# Patient Record
Sex: MALE | Race: OTHER | Employment: FULL TIME | URBAN - METROPOLITAN AREA
[De-identification: names, ages, dates, MRNs, and addresses within clinical notes are randomized per-mention and may not be internally consistent; named-entity substitution may affect disease eponyms.]

---

## 2017-11-07 ENCOUNTER — OFFICE VISIT (OUTPATIENT)
Dept: INTERNAL MEDICINE | Age: 37
End: 2017-11-07
Payer: MEDICARE

## 2017-11-07 VITALS
OXYGEN SATURATION: 98 % | SYSTOLIC BLOOD PRESSURE: 132 MMHG | WEIGHT: 230.2 LBS | HEART RATE: 74 BPM | RESPIRATION RATE: 12 BRPM | HEIGHT: 75 IN | BODY MASS INDEX: 28.62 KG/M2 | DIASTOLIC BLOOD PRESSURE: 82 MMHG

## 2017-11-07 DIAGNOSIS — S76.212A GROIN STRAIN, LEFT, INITIAL ENCOUNTER: ICD-10-CM

## 2017-11-07 DIAGNOSIS — M25.562 CHRONIC PAIN OF LEFT KNEE: Primary | ICD-10-CM

## 2017-11-07 DIAGNOSIS — G57.62 MORTON NEUROMA, LEFT: ICD-10-CM

## 2017-11-07 DIAGNOSIS — G89.29 CHRONIC PAIN OF LEFT KNEE: Primary | ICD-10-CM

## 2017-11-07 DIAGNOSIS — D17.22 LIPOMA OF LEFT UPPER EXTREMITY: ICD-10-CM

## 2017-11-07 PROCEDURE — G8484 FLU IMMUNIZE NO ADMIN: HCPCS | Performed by: INTERNAL MEDICINE

## 2017-11-07 PROCEDURE — 99203 OFFICE O/P NEW LOW 30 MIN: CPT | Performed by: INTERNAL MEDICINE

## 2017-11-07 PROCEDURE — G8427 DOCREV CUR MEDS BY ELIG CLIN: HCPCS | Performed by: INTERNAL MEDICINE

## 2017-11-07 PROCEDURE — G8419 CALC BMI OUT NRM PARAM NOF/U: HCPCS | Performed by: INTERNAL MEDICINE

## 2017-11-07 PROCEDURE — 4004F PT TOBACCO SCREEN RCVD TLK: CPT | Performed by: INTERNAL MEDICINE

## 2017-11-07 ASSESSMENT — PATIENT HEALTH QUESTIONNAIRE - PHQ9
2. FEELING DOWN, DEPRESSED OR HOPELESS: 0
1. LITTLE INTEREST OR PLEASURE IN DOING THINGS: 0
SUM OF ALL RESPONSES TO PHQ9 QUESTIONS 1 & 2: 0
SUM OF ALL RESPONSES TO PHQ QUESTIONS 1-9: 0

## 2017-11-07 ASSESSMENT — ENCOUNTER SYMPTOMS
GASTROINTESTINAL NEGATIVE: 1
WHEEZING: 1
EYES NEGATIVE: 1
ALLERGIC/IMMUNOLOGIC NEGATIVE: 1

## 2017-11-07 NOTE — PROGRESS NOTES
[facility] INTERNAL MEDICINE [place] [address] [address]  [address]   [address] [address]  Dept: [phone]  Dept Fax: [phone]    Madeline Mcclure is a 44 y.o. male who presents today for   Chief Complaint   Patient presents with   [facility].    and follow up of chronic medical problems: There is no problem list on file for this patient. .    No past medical history on file. No past surgical history on file. No family history on file. Social History   Substance Use Topics    Smoking status: Not on file    Smokeless tobacco: Not on file    Alcohol use Not on file      No current outpatient prescriptions on file. No current facility-administered medications for this visit. Allergies not on file    Health Maintenance   Topic Date Due    HIV screen  10/14/1994    DTaP/Tdap/Td vaccine (1 - Tdap) 10/14/1998    Flu vaccine (1) 09/01/2017       Controlled Substances Monitoring:      HPI:     Knee Pain    The incident occurred more than 1 week ago (over a year). There was no injury mechanism. The pain is present in the left knee. The pain is moderate. Associated symptoms comments: At times swollen. . The symptoms are aggravated by weight bearing (kneeling). He has tried nothing for the symptoms. The treatment provided no relief. Groin Pain   The patient's primary symptoms include pelvic pain. Primary symptoms comment: left inguinal area. This is a new problem. The current episode started more than 1 year ago. The problem occurs constantly. The problem has been waxing and waning. The pain is medium (worse with walking). Associated symptoms include chills and a rash. The symptoms are aggravated by heavy lifting (walking). He has tried nothing for the symptoms. Rash   This is a new problem. The current episode started more than 1 year ago (several years has gotten larger and painful with lifting). The affected locations include the left arm (medial upper arm).  Past other physician or provider since your last visit? No  Have you had any other diagnostic tests since your last visit? No  Have you been seen in the emergency room and/or had an admission to a hospital since we last saw you? No  Have you had your routine dental cleaning in the past 6 months? no    Have you activated your [employer] account? If not, what are your barriers? No:     No care team member to display    Medical History Review  Past Medical, Family, and Social History reviewed and does contribute to the patient presenting condition    Health Maintenance   Topic Date Due    HIV screen  10/14/1994    DTaP/Tdap/Td vaccine (1 - Tdap) 10/14/1998    Flu vaccine (1) 09/01/2017     /82   Pulse 74   Resp 12   Ht 6' 3\" (1.905 m)   Wt 230 lb 3.2 oz (104.4 kg)   SpO2 98%   BMI 28.77 kg/m²     Assessment:      No diagnosis found. Plan:       1.  Jason Wyatt received counseling on the following healthy behaviors: medication adherence    2. Prior labs and health maintenance reviewed. 3.  Discussed use, benefit, and side effects of prescribed medications. Barriers to medication compliance addressed. All his questions were answered. Pt voiced understanding. Jason Wyatt will continue current medications, diet and exercise. No orders of the defined types were placed in this encounter. Completed Refills               Requested Prescriptions      No prescriptions requested or ordered in this encounter       4. Patient given educational materials - see patient instructions    5. Was a self-tracking handout given in paper form or via [employer]? No  If yes, see orders or list here. No orders of the defined types were placed in this encounter. No Follow-up on file. Patient agreed with treatment plan.     Electronically signed by Rah Wolff MD on 11/7/2017 at 3:24 PM

## 2017-12-06 ENCOUNTER — OFFICE VISIT (OUTPATIENT)
Dept: SURGERY | Age: 37
End: 2017-12-06
Payer: MEDICARE

## 2017-12-06 VITALS
DIASTOLIC BLOOD PRESSURE: 84 MMHG | WEIGHT: 233.2 LBS | TEMPERATURE: 97.5 F | SYSTOLIC BLOOD PRESSURE: 136 MMHG | BODY MASS INDEX: 29 KG/M2 | HEART RATE: 71 BPM | HEIGHT: 75 IN

## 2017-12-06 DIAGNOSIS — R22.32 MASS OF ARM, LEFT: ICD-10-CM

## 2017-12-06 DIAGNOSIS — R10.32 GROIN PAIN, LEFT: Primary | ICD-10-CM

## 2017-12-06 PROCEDURE — 4004F PT TOBACCO SCREEN RCVD TLK: CPT | Performed by: STUDENT IN AN ORGANIZED HEALTH CARE EDUCATION/TRAINING PROGRAM

## 2017-12-06 PROCEDURE — G8419 CALC BMI OUT NRM PARAM NOF/U: HCPCS | Performed by: STUDENT IN AN ORGANIZED HEALTH CARE EDUCATION/TRAINING PROGRAM

## 2017-12-06 PROCEDURE — G8427 DOCREV CUR MEDS BY ELIG CLIN: HCPCS | Performed by: STUDENT IN AN ORGANIZED HEALTH CARE EDUCATION/TRAINING PROGRAM

## 2017-12-06 PROCEDURE — G8484 FLU IMMUNIZE NO ADMIN: HCPCS | Performed by: STUDENT IN AN ORGANIZED HEALTH CARE EDUCATION/TRAINING PROGRAM

## 2017-12-06 PROCEDURE — 99202 OFFICE O/P NEW SF 15 MIN: CPT | Performed by: STUDENT IN AN ORGANIZED HEALTH CARE EDUCATION/TRAINING PROGRAM

## 2017-12-06 NOTE — PROGRESS NOTES
Social History Main Topics    Smoking status: Light Tobacco Smoker     Types: Cigarettes    Smokeless tobacco: Never Used    Alcohol use Yes      Comment: socially     Drug use: No    Sexual activity: Yes     Partners: Female     Other Topics Concern    Not on file     Social History Narrative    No narrative on file       ROS:   GEN: Denies recent weight loss, fatigue, fevers, chills, admits to Left groin pain when walking and left arm mass  HEENT: No rhinorrhea, dysphagia, odynphagia. CV: No history of MI, recent chest pain. RESP: Denies shortness of breath, COPD, asthma. GI: denies any n/v, diarrhea or constipation    : Denies increased frequency or dysuria. HEM[de-identified] Denies history of anemia or DVTs. ENDO: Denies history of thyroid problems or diabetes. NEURO: Denies history of CVA, TIA. Physical Exam:  Vitals:    12/06/17 1057   BP: 136/84   Pulse: 71   Temp: 97.5 °F (36.4 °C)     General:A & O x3  HEENT:  NCAT, PERRL, EMOI, oral mucus membrane pink and moist, no mass palpated on neck exam  BREAST:Deferred  Heart: S1S2, no mumurs, RRR  Lungs: clear to auscultation without wheezes or rales  Abdomen: soft, nontender, no HSM, no guarding, no rebound, no masses, pot modest about inguinal exam and would not let examiner do full exam   RECTAL: deferred  Extremity: Normal, without deformities, edema, or skin discoloration  SKIN: Skin color, texture, turgor normal. No rashes or lesions. Neuro: CN II-XII grossly intact. No motor or sensory deficits appreciated. MK:normal throughout upper and lower extremities, mass of inner upper left arm 2x2cm mobile, non tender to palpation     Assessment     1. Groin pain, left  CT ABDOMEN PELVIS W IV CONTRAST Additional Contrast? Oral   2. Mass of arm, left         Plan   1.  CT abd/plv ordered for evaluation of left inguinal/groin pain, will f/u with results once completed  2.  Discussion about left arm mass and pt would prefer no further workup at this time or intervention and pt was informed to come back as needed when comfortable with evaluation/surgery  3. Recommended pt to return to PCP or call for referral to an orthopedic surgeon for his b/l knee pain L>R that he has been having for years and progressively getting worse    Mandeepelenita Laura  12/6/2017    I have reviewed the resident's note and I either performed the key elements of the medical history and physical exam or was present with the resident when the key elements of the medical history and physical exam were performed. I have discussed the findings, established the care plan and recommendations with Resident.     Electronically signed by Yvette Damon IV, DO on 12/13/17 at 9:10 AM

## 2017-12-29 ENCOUNTER — HOSPITAL ENCOUNTER (OUTPATIENT)
Dept: GENERAL RADIOLOGY | Age: 37
Discharge: HOME | End: 2017-12-29
Payer: MEDICARE

## 2017-12-29 ENCOUNTER — HOSPITAL ENCOUNTER (OUTPATIENT)
Age: 37
Discharge: HOME | End: 2017-12-29
Payer: MEDICARE

## 2017-12-29 ENCOUNTER — HOSPITAL ENCOUNTER (OUTPATIENT)
Dept: CT IMAGING | Age: 37
Discharge: HOME | End: 2017-12-29
Payer: MEDICARE

## 2017-12-29 DIAGNOSIS — R10.32 GROIN PAIN, LEFT: ICD-10-CM

## 2017-12-29 DIAGNOSIS — M25.562 CHRONIC PAIN OF LEFT KNEE: ICD-10-CM

## 2017-12-29 DIAGNOSIS — G89.29 CHRONIC PAIN OF LEFT KNEE: ICD-10-CM

## 2017-12-29 PROCEDURE — 73562 X-RAY EXAM OF KNEE 3: CPT

## 2017-12-29 PROCEDURE — 74177 CT ABD & PELVIS W/CONTRAST: CPT

## 2017-12-29 PROCEDURE — 6360000004 HC RX CONTRAST MEDICATION: Performed by: STUDENT IN AN ORGANIZED HEALTH CARE EDUCATION/TRAINING PROGRAM

## 2017-12-29 RX ADMIN — IOPAMIDOL 75 ML: 755 INJECTION, SOLUTION INTRAVENOUS at 09:48

## 2017-12-29 RX ADMIN — IOHEXOL 50 ML: 240 INJECTION, SOLUTION INTRATHECAL; INTRAVASCULAR; INTRAVENOUS; ORAL at 08:45

## 2018-01-17 ENCOUNTER — OFFICE VISIT (OUTPATIENT)
Dept: PODIATRY | Age: 38
End: 2018-01-17
Payer: MEDICARE

## 2018-01-17 VITALS — HEIGHT: 75 IN | BODY MASS INDEX: 28.6 KG/M2 | WEIGHT: 230 LBS

## 2018-01-17 DIAGNOSIS — M79.671 PAIN IN BOTH FEET: ICD-10-CM

## 2018-01-17 DIAGNOSIS — G57.62 NEUROMA, MORTON'S, LEFT: ICD-10-CM

## 2018-01-17 DIAGNOSIS — M72.2 PLANTAR FASCIAL FIBROMATOSIS: Primary | ICD-10-CM

## 2018-01-17 DIAGNOSIS — M79.672 PAIN IN BOTH FEET: ICD-10-CM

## 2018-01-17 PROCEDURE — G8419 CALC BMI OUT NRM PARAM NOF/U: HCPCS | Performed by: PODIATRIST

## 2018-01-17 PROCEDURE — 99203 OFFICE O/P NEW LOW 30 MIN: CPT | Performed by: PODIATRIST

## 2018-01-17 PROCEDURE — L3020 FOOT LONGITUD/METATARSAL SUP: HCPCS | Performed by: PODIATRIST

## 2018-01-17 PROCEDURE — 4004F PT TOBACCO SCREEN RCVD TLK: CPT | Performed by: PODIATRIST

## 2018-01-17 PROCEDURE — G8484 FLU IMMUNIZE NO ADMIN: HCPCS | Performed by: PODIATRIST

## 2018-01-17 PROCEDURE — G8427 DOCREV CUR MEDS BY ELIG CLIN: HCPCS | Performed by: PODIATRIST

## 2018-01-17 NOTE — PROGRESS NOTES
[facility] Podiatry  New Patient History and Physical    Chief Complaint   Patient presents with    New Patient    Follow-Up from [facility]'s ED    Neuroma         HPI: Campos Fonseca is a 44 y.o. male who presents to the office today complaining of possible haskins's neuroma. Symptoms began several month(s) ago. Patient has noticed bump getting bigger and having trouble finding shoes that fit Patient relates pain is Present. Pain is rated 7 out of 10 and is described as constant, moderate, severe. Treatments prior to today's visit include: yes, seen at s ED. Currently denies F/C/N/V. No Known Allergies    No past medical history on file. Prior to Admission medications    Not on File       No past surgical history on file. No family history on file. Social History   Substance Use Topics    Smoking status: Light Tobacco Smoker     Types: Cigarettes    Smokeless tobacco: Never Used    Alcohol use Yes      Comment: socially        Review of Systems    Lower Extremity Physical Examination:     Vitals: There were no vitals filed for this visit. General: AAO x 3 in NAD. Dermatologic Exam:  Skin lesion/ulceration Absent . Skin No rashes or nodules noted. .       Musculoskeletal:     1st MPJ ROM decreased, Bilateral.  Muscle strength 5/5, Bilateral.  Pain present upon palpation of right and left foot 3rd interspace. Pain on palpation of the medial calcaneal tuberosity of the right and left foot. Pain illicited with DF of the toes. .  Medial longitudinal arch, Bilateral WNL.   Ankle ROM WNL,Bilateral.    Dorsally contracted digits absent digits 1-5 Bilateral.     Vascular: DP and PT pulses palpable 2/4, Bilateral.  CFT <3 seconds, Bilateral.  Hair growth present to the level of the digits, Bilateral.  Edema absent, Bilateral.  Varicosities absent, Bilateral. Erythema absent, Bilateral    Neurological: Sensation intact to light touch to level of digits, Bilateral.  Protective

## 2018-02-07 ENCOUNTER — OFFICE VISIT (OUTPATIENT)
Dept: INTERNAL MEDICINE | Age: 38
End: 2018-02-07
Payer: MEDICARE

## 2018-02-07 VITALS
BODY MASS INDEX: 30.54 KG/M2 | DIASTOLIC BLOOD PRESSURE: 84 MMHG | HEART RATE: 78 BPM | OXYGEN SATURATION: 98 % | HEIGHT: 75 IN | RESPIRATION RATE: 12 BRPM | SYSTOLIC BLOOD PRESSURE: 124 MMHG | WEIGHT: 245.6 LBS

## 2018-02-07 DIAGNOSIS — Z00.00 WELL ADULT EXAM: Primary | ICD-10-CM

## 2018-02-07 PROCEDURE — 99395 PREV VISIT EST AGE 18-39: CPT | Performed by: INTERNAL MEDICINE

## 2018-02-07 ASSESSMENT — ENCOUNTER SYMPTOMS
ALLERGIC/IMMUNOLOGIC NEGATIVE: 1
EYES NEGATIVE: 1
RESPIRATORY NEGATIVE: 1
GASTROINTESTINAL NEGATIVE: 1

## 2018-02-07 NOTE — PROGRESS NOTES
[address] [facility] INTERNAL MEDICINE [place] [address] [id number]  [address]   [address] [address]  Dept: [phone]  Dept Fax: [phone]    Matt Pastrana is a 44 y.o. male who presents today for   Chief Complaint   Patient presents with    3 Month Follow-Up    and follow up of chronic medical problems: There is no problem list on file for this patient. .    No past medical history on file. No past surgical history on file. No family history on file. Social History   Substance Use Topics    Smoking status: Light Tobacco Smoker     Types: Cigarettes    Smokeless tobacco: Never Used    Alcohol use Yes      Comment: socially       No current outpatient prescriptions on file. No current facility-administered medications for this visit. No Known Allergies    Health Maintenance   Topic Date Due    Pneumococcal med risk (1 of 1 - PPSV23) 08/07/2018 (Originally 10/14/1998)    DTaP/Tdap/Td vaccine (1 - Tdap) 11/07/2018 (Originally 10/14/1998)    Flu vaccine (1) 11/07/2018 (Originally 9/1/2017)    HIV screen  11/07/2018 (Originally 10/14/1994)       Controlled Substances Monitoring:      HPI:     Here for physical and well visit. Podiatrist diagnosed bilateral plantar fasciitis. And Benavides's neuroma. He has ordered custom inserts. General surgery ordered a CT of abdomen which was normal.        ROS:     Review of Systems   Constitutional: Negative. HENT: Negative. Eyes: Negative. Respiratory: Negative. Cardiovascular: Negative. Gastrointestinal: Negative. Endocrine: Negative. Genitourinary: Negative. Musculoskeletal: Negative. Skin: Negative. Allergic/Immunologic: Negative. Neurological: Negative. Hematological: Negative. Psychiatric/Behavioral: Negative. All other systems reviewed and are negative. Objective:     Physical Exam   Constitutional: He is oriented to person, place, and time. He appears well-developed and well-nourished.

## 2018-03-23 DIAGNOSIS — M77.31 CALCANEAL SPUR OF BOTH FEET: ICD-10-CM

## 2018-03-23 DIAGNOSIS — M72.2 PLANTAR FASCIAL FIBROMATOSIS: Primary | ICD-10-CM

## 2018-03-23 DIAGNOSIS — M77.32 CALCANEAL SPUR OF BOTH FEET: ICD-10-CM

## 2018-04-11 PROBLEM — Z00.00 WELL ADULT EXAM: Status: RESOLVED | Noted: 2018-02-07 | Resolved: 2018-04-11

## 2018-04-24 ENCOUNTER — OFFICE VISIT (OUTPATIENT)
Dept: PODIATRY | Age: 38
End: 2018-04-24
Payer: MEDICARE

## 2018-04-24 VITALS — HEIGHT: 75 IN | WEIGHT: 230 LBS | BODY MASS INDEX: 28.6 KG/M2

## 2018-04-24 DIAGNOSIS — M79.672 PAIN IN BOTH FEET: ICD-10-CM

## 2018-04-24 DIAGNOSIS — M25.551 HIP PAIN, BILATERAL: ICD-10-CM

## 2018-04-24 DIAGNOSIS — G57.62 MORTON'S NEUROMA, LEFT: ICD-10-CM

## 2018-04-24 DIAGNOSIS — M25.552 HIP PAIN, BILATERAL: ICD-10-CM

## 2018-04-24 DIAGNOSIS — M79.671 PAIN IN BOTH FEET: ICD-10-CM

## 2018-04-24 DIAGNOSIS — M72.2 PLANTAR FASCIAL FIBROMATOSIS: Primary | ICD-10-CM

## 2018-04-24 PROCEDURE — G8417 CALC BMI ABV UP PARAM F/U: HCPCS | Performed by: PODIATRIST

## 2018-04-24 PROCEDURE — 99213 OFFICE O/P EST LOW 20 MIN: CPT | Performed by: PODIATRIST

## 2018-04-24 PROCEDURE — G8427 DOCREV CUR MEDS BY ELIG CLIN: HCPCS | Performed by: PODIATRIST

## 2018-04-24 PROCEDURE — 4004F PT TOBACCO SCREEN RCVD TLK: CPT | Performed by: PODIATRIST

## 2018-04-24 RX ORDER — PREDNISONE 10 MG/1
TABLET ORAL
Qty: 20 TABLET | Refills: 0 | Status: SHIPPED | OUTPATIENT
Start: 2018-04-24

## 2018-07-28 ENCOUNTER — HOSPITAL ENCOUNTER (EMERGENCY)
Age: 38
Discharge: HOME | End: 2018-07-28
Attending: EMERGENCY MEDICINE
Payer: MEDICARE

## 2018-07-28 VITALS
RESPIRATION RATE: 17 BRPM | DIASTOLIC BLOOD PRESSURE: 77 MMHG | TEMPERATURE: 98.2 F | OXYGEN SATURATION: 93 % | SYSTOLIC BLOOD PRESSURE: 126 MMHG | HEART RATE: 97 BPM

## 2018-07-28 DIAGNOSIS — T40.1X1A ACCIDENTAL OVERDOSE OF HEROIN, INITIAL ENCOUNTER (HCC): Primary | ICD-10-CM

## 2018-07-28 PROCEDURE — 96374 THER/PROPH/DIAG INJ IV PUSH: CPT

## 2018-07-28 PROCEDURE — 99284 EMERGENCY DEPT VISIT MOD MDM: CPT

## 2018-07-28 PROCEDURE — 6360000002 HC RX W HCPCS: Performed by: EMERGENCY MEDICINE

## 2018-07-28 RX ORDER — NALOXONE HYDROCHLORIDE 1 MG/ML
2 INJECTION INTRAMUSCULAR; INTRAVENOUS; SUBCUTANEOUS ONCE
Status: COMPLETED | OUTPATIENT
Start: 2018-07-28 | End: 2018-07-28

## 2018-07-28 RX ADMIN — NALOXONE HYDROCHLORIDE 2 MG: 1 INJECTION PARENTERAL at 21:46

## 2018-07-28 ASSESSMENT — ENCOUNTER SYMPTOMS
NAUSEA: 1
RHINORRHEA: 0
COLOR CHANGE: 0
ABDOMINAL PAIN: 0
COUGH: 0
VOMITING: 0
SHORTNESS OF BREATH: 0
EYE PAIN: 0
SORE THROAT: 0

## 2018-07-28 NOTE — ED NOTES
Bed: 21  Expected date:   Expected time:   Means of arrival:   Comments:  18 Pastor Wolf RN  07/28/18 1929

## 2018-07-28 NOTE — ED NOTES
Bed: 15  Expected date:   Expected time:   Means of arrival:   Comments:  [address] [place] [address] Rd, RN  07/28/18 1932

## 2018-07-28 NOTE — ED PROVIDER NOTES
[facility]     Emergency Department     Faculty Attestation    I performed a history and physical examination of the patient and discussed management with the resident. I reviewed the residents note and agree with the documented findings and plan of care. Any areas of disagreement are noted on the chart. I was personally present for the key portions of any procedures. I have documented in the chart those procedures where I was not present during the key portions. I have reviewed the emergency nurses triage note. I agree with the chief complaint, past medical history, past surgical history, allergies, medications, social and family history as documented unless otherwise noted below. For Physician Assistant/ Nurse Practitioner cases/documentation I have personally evaluated this patient and have completed at least one if not all key elements of the E/M (history, physical exam, and MDM). Additional findings are as noted. Overdose responded to Narcan, awake alert and oriented, pupils 2 mm and reactive, good airway, equal breath sounds, heart regular rate and rhythm.  DART officer in room    Guy Rm MD  Attending Emergency  Physician              Arthur Borrego MD  07/28/18 5945

## 2018-07-29 NOTE — ED NOTES
Pt ambulated with a steady gait. NAD noted. Pt mother given a Narcan kit and instructed on use.       Radha Oglesby RN  07/28/18 8661

## 2018-07-29 NOTE — ED NOTES
Pt states that he is still feeling affects of Heroin to physician      Deisi Perez, MERCEDES  07/28/18 2154       Deisi Perez, [facility]  07/28/18 2155

## 2018-07-29 NOTE — ED PROVIDER NOTES
predniSONE (DELTASONE) 10 MG tablet One PO TID x 3 days One PO BID x 3 days One PO q daily x 3 days 1/2 po q daily x 4 days 4/24/18   Moose Lau DPM       REVIEW OF SYSTEMS    (2-9 systems for level 4, 10 or more for level 5)      Review of Systems   Constitutional: Negative for chills and fever. HENT: Negative for rhinorrhea and sore throat. Eyes: Negative for pain and visual disturbance. Respiratory: Negative for cough and shortness of breath. Cardiovascular: Negative for chest pain and palpitations. Gastrointestinal: Positive for nausea. Negative for abdominal pain and vomiting. Genitourinary: Negative for difficulty urinating and dysuria. Musculoskeletal: Negative for arthralgias and myalgias. Skin: Negative for color change and wound. Neurological: Negative for weakness, numbness and headaches. Psychiatric/Behavioral: Negative for behavioral problems and dysphoric mood. PHYSICAL EXAM   (up to 7 for level 4, 8 or more for level 5)      INITIAL VITALS:   /77   Pulse 97   Temp 98.2 °F (36.8 °C) (Oral)   Resp 17   SpO2 93%     Physical Exam   Constitutional: He is oriented to person, place, and time. He appears well-developed and well-nourished. No distress. HENT:   Head: Normocephalic and atraumatic. Mouth/Throat: Oropharynx is clear and moist.   Eyes: EOM are normal. Pupils are equal, round, and reactive to light. Neck: Normal range of motion. Cardiovascular: Normal rate and regular rhythm. Pulmonary/Chest: Effort normal. He has no wheezes. He has no rales. Abdominal: Soft. There is no tenderness. There is no rebound and no guarding. Musculoskeletal: Normal range of motion. Neurological: He is alert and oriented to person, place, and time. He has normal strength. No cranial nerve deficit or sensory deficit. GCS eye subscore is 4. GCS verbal subscore is 5. GCS motor subscore is 6. Skin: Skin is warm and dry.    Psychiatric: His behavior is normal. DIFFERENTIAL  DIAGNOSIS     PLAN (LABS / IMAGING / EKG):  No orders of the defined types were placed in this encounter. MEDICATIONS ORDERED:  Orders Placed This Encounter   Medications    naloxone (NARCAN) injection 2 mg       DIAGNOSTIC RESULTS / EMERGENCY DEPARTMENT COURSE / MDM     LABS:  No results found for this visit on 07/28/18. IMPRESSION: Patient was brought in by EMS after suspected heroin overdose. Patient states that he did snort heroin today. Patient's afebrile, little tachycardic in the 100s, but otherwise hemodynamically stable. Pupils are about 3 mm bilaterally and react to light. He is neurologically intact no gross motor sensory deficit. Given his presentation, we'll plan to observe for the next 2 hours. Anticipate discharge. RADIOLOGY:  None    EKG  None    All EKG's are interpreted by the Emergency Department Physician who either signs or Co-signs this chart in the absence of a cardiologist.    EMERGENCY DEPARTMENT COURSE:  Patient was observed in the ED for several hours. Patient's vitals were stable. He was able to ambulate without difficulty. He is tolerating oral intake. She was seen, evaluated and spoken to by SISSY. Recommended follow-up with his PCP. Discussed that if symptoms persist or worsen, that he should return to the ED. Patient demonstrated his understanding and is agreeable to plan. Patient is stable for discharge. PROCEDURES:  None    CONSULTS:  None    CRITICAL CARE:  None    FINAL IMPRESSION      1.  Accidental overdose of heroin, initial encounter          DISPOSITION / PLAN     DISPOSITION Decision To Discharge 07/28/2018 09:46:15 PM      PATIENT REFERRED TO:  Dell Moser MD  [address]  [phone]    Schedule an appointment as soon as possible for a visit   As needed, If symptoms worsen      DISCHARGE MEDICATIONS:  Discharge Medication List as of 7/28/2018  9:46 PM          Guillermina Pugh

## 2022-04-12 ENCOUNTER — HOSPITAL ENCOUNTER (EMERGENCY)
Age: 42
Discharge: HOME | End: 2022-04-12
Attending: EMERGENCY MEDICINE
Payer: COMMERCIAL

## 2022-04-12 VITALS
WEIGHT: 235 LBS | HEART RATE: 92 BPM | TEMPERATURE: 97.4 F | SYSTOLIC BLOOD PRESSURE: 147 MMHG | BODY MASS INDEX: 29.22 KG/M2 | OXYGEN SATURATION: 97 % | RESPIRATION RATE: 16 BRPM | HEIGHT: 75 IN | DIASTOLIC BLOOD PRESSURE: 95 MMHG

## 2022-04-12 DIAGNOSIS — S39.012A STRAIN OF LUMBAR REGION, INITIAL ENCOUNTER: Primary | ICD-10-CM

## 2022-04-12 PROCEDURE — 99283 EMERGENCY DEPT VISIT LOW MDM: CPT

## 2022-04-12 PROCEDURE — 6370000000 HC RX 637 (ALT 250 FOR IP): Performed by: STUDENT IN AN ORGANIZED HEALTH CARE EDUCATION/TRAINING PROGRAM

## 2022-04-12 RX ORDER — IBUPROFEN 800 MG/1
800 TABLET ORAL ONCE
Status: COMPLETED | OUTPATIENT
Start: 2022-04-12 | End: 2022-04-12

## 2022-04-12 RX ORDER — IBUPROFEN 800 MG/1
800 TABLET ORAL EVERY 6 HOURS PRN
Qty: 21 TABLET | Refills: 0 | Status: SHIPPED | OUTPATIENT
Start: 2022-04-12 | End: 2022-04-17

## 2022-04-12 RX ORDER — CYCLOBENZAPRINE HCL 10 MG
10 TABLET ORAL ONCE
Status: COMPLETED | OUTPATIENT
Start: 2022-04-12 | End: 2022-04-12

## 2022-04-12 RX ORDER — ACETAMINOPHEN 500 MG
1000 TABLET ORAL ONCE
Status: COMPLETED | OUTPATIENT
Start: 2022-04-12 | End: 2022-04-12

## 2022-04-12 RX ORDER — CYCLOBENZAPRINE HCL 10 MG
10 TABLET ORAL 3 TIMES DAILY PRN
Qty: 9 TABLET | Refills: 0 | Status: SHIPPED | OUTPATIENT
Start: 2022-04-12 | End: 2022-04-15

## 2022-04-12 RX ORDER — LIDOCAINE 4 G/G
1 PATCH TOPICAL ONCE
Status: DISCONTINUED | OUTPATIENT
Start: 2022-04-12 | End: 2022-04-12 | Stop reason: HOSPADM

## 2022-04-12 RX ADMIN — CYCLOBENZAPRINE 10 MG: 10 TABLET, FILM COATED ORAL at 02:15

## 2022-04-12 RX ADMIN — ACETAMINOPHEN 1000 MG: 500 TABLET ORAL at 02:16

## 2022-04-12 RX ADMIN — IBUPROFEN 800 MG: 800 TABLET, FILM COATED ORAL at 02:15

## 2022-04-12 ASSESSMENT — ENCOUNTER SYMPTOMS
SHORTNESS OF BREATH: 0
NAUSEA: 0
VOMITING: 0
ABDOMINAL PAIN: 0
BACK PAIN: 1

## 2022-04-12 ASSESSMENT — PAIN DESCRIPTION - LOCATION: LOCATION: BACK

## 2022-04-12 ASSESSMENT — PAIN SCALES - GENERAL
PAINLEVEL_OUTOF10: 9
PAINLEVEL_OUTOF10: 8

## 2022-04-12 ASSESSMENT — PAIN DESCRIPTION - PAIN TYPE: TYPE: ACUTE PAIN

## 2022-04-12 ASSESSMENT — PAIN DESCRIPTION - DESCRIPTORS: DESCRIPTORS: SHARP

## 2022-04-12 NOTE — ED NOTES
Report given to Zhanna Miller RN  Patient was filling out workmans comp paperwork   Paperwork to be completed by Dr. Zeenat Donaldson then patient can be discharged     Helen Omer RN  04/12/22 9315

## 2022-04-12 NOTE — ED NOTES
Patient presented to ER with complaints of back pain after feeling a sharp pain in back while lifting a box at work  No numbness or tingling verbalized  Patient able to ambulate steadily but verbalizes pain  Pain 7/10  Patient given call light     Adriel Núñez RN  04/12/22 7115

## 2022-04-12 NOTE — ED PROVIDER NOTES
[facility]     Emergency Department     Faculty Attestation    I performed a history and physical examination of the patient and discussed management with the resident. I have reviewed and agree with the residents findings including all diagnostic interpretations, and treatment plans as written. Any areas of disagreement are noted on the chart. I was personally present for the key portions of any procedures. I have documented in the chart those procedures where I was not present during the key portions. I have reviewed the emergency nurses triage note. I agree with the chief complaint, past medical history, past surgical history, allergies, medications, social and family history as documented unless otherwise noted below. Documentation of the HPI, Physical Exam and Medical Decision Making performed by scriblucy is based on my personal performance of the HPI, PE and MDM. For Physician Assistant/ Nurse Practitioner cases/documentation I have personally evaluated this patient and have completed at least one if not all key elements of the E/M (history, physical exam, and MDM). Additional findings are as noted. 43 yo M c/o L lower back pain while at work, [employer]" object, no direct blow, no sensory change, no urinary complaint, no fever,   Pt denies iv drug use,   pe vss gcs 15, steady even gait, abdomen non tender, no distension, no rigidity, no mass, well localized / reproducible tenderness L lateral to L 45, skin intact, no indication of back infection / injury, no midline thoracic or lumbar tenderness,   Sensation intact bilateral lower extremities,     -no finding of cord compression, I feel pt stable for out pt tx,     EKG Interpretation    Interpreted by me      CRITICAL CARE: There was a high probability of clinically significant/life threatening deterioration in this patient's condition which required my urgent intervention.   Total critical care time was 0 minutes. This excludes any time for separately reportable procedures.        [facility], DO  04/12/22 0150       Abdulkadir [facility], DO  04/12/22 9028       [facility], DO  04/12/22 0230

## 2022-04-12 NOTE — ED PROVIDER NOTES
[address] [facility]  ED  Emergency Department Encounter  Emergency Medicine Resident     Pt Name: Maura Kanner  MRN: [id number]  Rasheedagfkrissy 1980  Date of evaluation: 4/12/22  PCP:  No primary care provider on file. CHIEF COMPLAINT       Chief Complaint   Patient presents with    Back Pain       HISTORY OFPRESENT ILLNESS  (Location/Symptom, Timing/Onset, Context/Setting, Quality, Duration, Modifying Jose Banana.)      Maura Kanner is a 42 y. o.yo male who presents with back pain. 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 Denies any bone pain any weakness in the trouble urinating, dysuria, hematuria. States he has no weakness or changes in gait. Does have a remote history of IV drug use, heroin states he has not done any IV drug use since 3 years. Denies fevers or chills or traumatic injuries. PAST MEDICAL / SURGICAL / SOCIAL / FAMILY HISTORY      has no past medical history on file. has no past surgical history on file.      Social History     Socioeconomic History    Marital status: Single     Spouse name: Not on file    Number of children: Not on file    Years of education: Not on file    Highest education level: Not on file   Occupational History    Not on file   Tobacco Use    Smoking status: Former Smoker     Types: Cigarettes    Smokeless tobacco: Never Used   Substance and Sexual Activity    Alcohol use: Yes     Comment: socially     Drug use: No    Sexual activity: Yes     Partners: Female   Other Topics Concern    Not on file   Social History Narrative    Not on file     Social Determinants of Health     Financial Resource Strain:     Difficulty of Paying Living Expenses: Not on file   Food Insecurity:     Worried About Running Out of Food in the Last Year: Not on file    [address] in the Last Year: Not on file   Transportation Needs:     Lack of Transportation (Medical): Not on file    Lack of Transportation (Non-Medical): Not on file   Physical Activity:     Days of Exercise per Week: Not on file    Minutes of Exercise per Session: Not on file   Stress:     Feeling of Stress : Not on file   Social Connections:     Frequency of Communication with Friends and Family: Not on file    Frequency of Social Gatherings with Friends and Family: Not on file    Attends Jainism Services: Not on file    Active Member of [address] [place] [address] [employer] or Organizations: Not on file    Attends Club or Organization Meetings: Not on file    Marital Status: Not on file   Intimate Partner Violence:     Fear of Current or Ex-Partner: Not on file    Emotionally Abused: Not on file    Physically Abused: Not on file    Sexually Abused: Not on file   Housing Stability:     Unable to Pay for Housing in the Last Year: Not on file    Number of [place] in the Last Year: Not on file    Unstable Housing in the Last Year: Not on file       History reviewed. No pertinent family history. Allergies:  Patient has no known allergies. Home Medications:  Prior to Admission medications    Medication Sig Start Date End Date Taking? Authorizing Provider   ibuprofen (IBU) 800 MG tablet Take 1 tablet by mouth every 6 hours as needed for Pain 4/12/22 4/17/22 Yes Samuel Penny MD   cyclobenzaprine (FLEXERIL) 10 MG tablet Take 1 tablet by mouth 3 times daily as needed for Muscle spasms 4/12/22 4/15/22 Yes Samuel Penny MD   predniSONE (DELTASONE) 10 MG tablet One PO TID x 3 days One PO BID x 3 days One PO q daily x 3 days 1/2 po q daily x 4 days 4/24/18   Phyllistarya oMbley DPM       REVIEW OFSYSTEMS    (2-9 systems for level 4, 10 or more for level 5)      Review of Systems   Constitutional: Negative for diaphoresis and fever. HENT: Negative for congestion.     Eyes: Negative for visual disturbance. Respiratory: Negative for shortness of breath. Cardiovascular: Negative for chest pain. Gastrointestinal: Negative for abdominal pain, nausea and vomiting. Endocrine: Negative for polyuria. Genitourinary: Negative for dysuria. Musculoskeletal: Positive for back pain. Skin: Negative for wound. Neurological: Negative for headaches. Psychiatric/Behavioral: Negative for confusion. PHYSICAL EXAM   (up to 7 for level 4, 8 or more forlevel 5)      ED TRIAGE VITALS BP: (!) 147/95, Temp: 97.4 °F (36.3 °C), Pulse: 92, Resp: 16, SpO2: 97 %    Vitals:    04/12/22 0117   BP: (!) 147/95   Pulse: 92   Resp: 16   Temp: 97.4 °F (36.3 °C)   TempSrc: Oral   SpO2: 97%   Weight: 235 lb (106.6 kg)   Height: 6' 3\" (1.905 m)       Physical Exam  Constitutional:       General: He is not in acute distress. Appearance: He is well-developed. HENT:      Head: Normocephalic and atraumatic. Nose: Nose normal.   Eyes:      Pupils: Pupils are equal, round, and reactive to light. Cardiovascular:      Rate and Rhythm: Normal rate and regular rhythm. Heart sounds: No murmur heard. Pulmonary:      Effort: Pulmonary effort is normal.   Abdominal:      Palpations: Abdomen is soft. Musculoskeletal:         General: Tenderness present. Normal range of motion. Cervical back: Normal range of motion and neck supple. Lumbar back: Spasms and tenderness present. Back:    Skin:     General: Skin is warm and dry. Capillary Refill: Capillary refill takes less than 2 seconds. Findings: No erythema or rash. Neurological:      Mental Status: He is alert and oriented to person, place, and time. Sensory: No sensory deficit. Deep Tendon Reflexes: Reflexes normal.   Psychiatric:         Behavior: Behavior normal.         DIFFERENTIAL  DIAGNOSIS     PLAN (LABS / IMAGING / EKG):  No orders of the defined types were placed in this encounter.       MEDICATIONS ORDERED:  Orders Placed This Encounter   Medications    ibuprofen (ADVIL;MOTRIN) tablet 800 mg    acetaminophen (TYLENOL) tablet 1,000 mg    cyclobenzaprine (FLEXERIL) tablet 10 mg    lidocaine 4 % external patch 1 patch    ibuprofen (IBU) 800 MG tablet     Sig: Take 1 tablet by mouth every 6 hours as needed for Pain     Dispense:  21 tablet     Refill:  0    cyclobenzaprine (FLEXERIL) 10 MG tablet     Sig: Take 1 tablet by mouth 3 times daily as needed for Muscle spasms     Dispense:  9 tablet     Refill:  0       DDX:     Lumbar strain versus muscular strain    Initial MDM/Plan: 42 y.o. male who presents with back pain. Here with acute onset of lumbar back pain  No bony tenderness  Remote history of IV drug use  This particular incident happened 4 days ago with clear etiology  No fevers or chills  Paraspinals pain and on the left side  Does not go past the knee, negative leg raise  Proprioception intact, no neuro deficits, no saddle anesthesia  Plan for pain control, muscle relaxer    Disposition:  Discharged with outpatient follow-up    DIAGNOSTIC RESULTS / EMERGENCYDEPARTMENT COURSE / MDM     LABS:  No results found for this visit on 04/12/22. RADIOLOGY:  No orders to display           EMERGENCY DEPARTMENT COURSE:  ED Course as of 04/12/22 0219   Tue Apr 12, 2022   0154 Patient seen and assessed in the emergency department no acute respiratory cardiovascular distress. 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 Denies any bone pain any weakness in the trouble urinating, dysuria, hematuria. States he has no weakness or changes in gait. Does have a remote history of IV drug use, heroin states he has not done any IV drug use since 3 years. Denies fevers or chills or traumatic injuries.  [PS]      ED Course User Index  [PS] Etta Reddy MD          PROCEDURES:  None    CONSULTS:  None    CRITICAL CARE:  Please see attending note    FINAL IMPRESSION      1.  Strain of lumbar region, initial encounter          DISPOSITION / PLAN     DISPOSITION Decision To Discharge 04/12/2022 01:53:47 AM       PATIENT REFERRED TO:  [facility]  [address] [place] [address]  [phone]    As needed, If symptoms worsen    [address] [address]  [phone]  In 1 week  Make an appointment soon as possible      DISCHARGE MEDICATIONS:  New Prescriptions    CYCLOBENZAPRINE (FLEXERIL) 10 MG TABLET    Take 1 tablet by mouth 3 times daily as needed for Muscle spasms    IBUPROFEN (IBU) 800 MG TABLET    Take 1 tablet by mouth every 6 hours as needed for Pain       Etta Reddy MD  Emergency Medicine Resident    (Please note that portions of this note were completed with a voice recognition program.Efforts were made to edit the dictations but occasionally words are mis-transcribed.)       Etta Reddy MD  Resident  04/12/22 3908

## 2022-04-27 ENCOUNTER — HOSPITAL ENCOUNTER (OUTPATIENT)
Dept: PHYSICAL THERAPY | Age: 42
Setting detail: THERAPIES SERIES
Discharge: HOME | End: 2022-04-27
Payer: COMMERCIAL

## 2022-04-27 PROCEDURE — 97110 THERAPEUTIC EXERCISES: CPT

## 2022-04-27 PROCEDURE — 97161 PT EVAL LOW COMPLEX 20 MIN: CPT

## 2022-04-27 NOTE — CONSULTS
[x] [facility]  Outpatient Physical Therapy  [address]  Phone: [phone]  Fax: [phone] [] [facility] at [address] [place] [address]  Phone: [phone]   Fax: [phone]     Physical Therapy Evaluation    Date:  2022  Patient: Nolberto Porras  : 1980  MRN: [id number]  Physician: Vinh Sinha MD  Insurance: [id number] 9 visits 22-22  Medical Diagnosis: Lumbar strain S39.012A    Rehab Codes: M54.59  Onset Date: 22                              Next 's appt:     Subjective:   CC: Patient reports ongoing low back soreness since his work injury. Patient notes increased pain with frequent bending, reaching forward, and lifting. Patient notes his back has been improving since pain onset but had a reaggravation 3 days ago. Patient continues to work in a light duty position but notes this position still causes back tightness as he must lean forward to reach and move product. 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 Patient has no history of back pain or lumbar procedures.      PMHx: [] Unremarkable [] Diabetes [] HTN  [] Pacemaker   [] MI/Heart Problems [] Cancer [] Arthritis [] Asthma                         [x] refer to full medical chart  In [facility]  [] Other:        Comorbidities:   [] Obesity [] Dialysis  [] Other:   [] Asthma/COPD [] Dementia [] Other:   [] Stroke [] Sleep apnea [] Other:   [] Vascular disease [] Rheumatic disease [] Other:     Tests: [] X-Ray: [] MRI:  [] Other:    Medications: [x] Refer to full medical record [] None [] Other:  Allergies:      [x] Refer to full medical record  [] None [] Other:    Working:  [x] Full-time  [] Part-time  [] Off d/t condition  [] Retired  [] Disability  [] N/A  Job/Employer: [employer], currently on light duty. Standing tolerance level based on total hours per work day? [] None  [] Occasional (1-2 hrs)  [] Frequent (3-5 hrs)   [x]Constant (6-8 hrs)  []Extra time (>8 hrs)    Maximum strength level for lifting or carrying at your most recent Job? [] Very Light (1-10 lb)  [] Light(11-25 lb)  [x] Medium (26-50 lb)    []Heavy( lb)    [] Extra Heavy (>100 lb)    Pain:  [x] Yes  [] No Location: Left side low back  Pain Rating: (0-10 scale) 5/10  Pain altered Tx:  [] Yes  [x] No  Action:    Objective: p = pain, L = Lacks      ROM  ° A/P STRENGTH  ROM    Left Right Left Right Cervical    Shoulder Flex     Flexion    Abduction     Extension    ER     Rotation L R    IR     Side bending L R   Elbow Flex          Ext          Wrist Flex         Ext     Lumbar    Hand      Flexion Full, p    Hip Flexion    4/5 4/5 Extension full   Ext   4/5 4/5 Rotation L 75% R 75%   Abd   4/5 4/5 Sidebend L 75% R 75%   Add     -------------------- --------- --------   ER     STRENGTH     IR     Abdominals 4/5    Knee Flex     Erector Spinae 4/5    Ext      Scapular L R   Ankle DF     -Scaption     PF      -Retraction     Inversion     -Horz Abd     Eversion     -Extension        Special Tests:   Positive:        Negative: SLR  Double leg lift: held 10 seconds , did not elicit back pain. OBSERVATION Comments   Posture No Deficit    Joint Alignment No Deficit    Gait No Deficit    Palpation Diffuse tenderness at midline lumbar spine   Edema No Deficit    Neurological No Deficit      Assessment: Patient demonstrates lumbar strain following his work injury. Patient will benefit from physical therapy to improve pain free lumbar ROM. Core endurance, and increase work capacity to return to his full duty position. Problems:    [x] ?  Pain: 5/10 low back   [x] ? ROM: lumbar rotation and side bending   [x] ? Flexibility: lumbar extensors, hamstrings   [x] ? Strength: 4/5 B hip flexors, glutes   [x] ? Function:Oswestry score 36% impaired  [] Other:    STG: (to be met in 9 treatments)  1. ? Pain: 2/10 low back pain with occupational duties. 2. ? ROM: lumbar flexion to reach toes to improve ending and reaching activity. 3. ? Strength: 5/5 B hip flexionand extension to improve trunk stability. 4. Patient is able to squat and lift 40 lbs without back pain. 5. ? Function:Oswestry score to 16% impaired or better to return to work. 6. Independent with Home Exercise Programs    Patient goals: Reduce back pain and improve function. Rehab Potential:  [x] Good  [] Fair  [] Poor   Suggested Professional Referral:  [x] No  [] Yes:  Barriers to Goal Achievement[de-identified]  [x] No  [] Yes:  Domestic Concerns:  [x] No  [] Yes:    Pt. Education:  [x] Plans/Goals, Risks/Benefits discussed  [x] Home exercise program: See chart below  Method of Education: [x] Verbal  [x] Demo  [x] Written  Comprehension of Education:  [x] Verbalizes understanding. [x] Demonstrates understanding. [x] Needs Review. [] Demonstrates/verbalizes understanding of HEP/Ed previously given. Treatment Plan:  [x] Therapeutic Exercise   41155  [x] Vasopneumatic cold with compression  01690    [x] Therapeutic Activity  28837 [x] Cold/hotpack    [x] Gait Training   18417 [x] Lumbar/Cervical Traction  [id number]   [x] Neuromuscular Re-education  30436 [x] Electrical Stimulation Unattended  56316   [x] Manual Therapy    32775 [x] Electrical Stimulation Attended  [id number]   [] Iontophoresis: 4 mg/mL Dexamethasone Sodium Phosphate  mAmin  24434 []  Medication allergies reviewed for use of   Dexamethasone Sodium Phosphate 4mg/ml with iontophoresis treatments. Pt is not allergic.        Frequency:  3 x/week for 9 visits    Todays Treatment:  Precautions:  Modalities:   Exercises:  Access Code: [email or web address]  URL: [email or web address]  Date: 04/28/2022  Prepared by: Heywood Habermann    Exercises  Prone Hip Extension - 1 x daily - 4 x weekly - 3 sets - 10 reps  Supine Bridge - 1 x daily - 4 x weekly - 3 sets - 10 reps  Supine Lower Trunk Rotation - 1 x daily - 4 x weekly - 3 sets - 10 reps  Bilateral Bent Leg Lift - 1 x daily - 4 x weekly - 3 sets - 10 reps  Standing Lumbar Extension - 1 x daily - 4 x weekly - 3 sets - 10 reps       Specific Instructions for next treatment[de-identified] Lumbar ROM, strengthening, work simulaed activities to build back tolerance       Evaluation Complexity:  History (Personal factors, comorbidities) [] 0 [x] 1-2 [] 3+   Exam (limitations, restrictions) [] 1-2 [x] 3 [] 4+   Clinical presentation (progression) [x] Stable [] Evolving  [] Unstable   Decision Making [x] Low [] Moderate [] High    [x] Low Complexity [] Moderate Complexity [] High Complexity       Treatment Charges: Mins Time in/out  Units   [x] Evaluation       [x]  Low       []  Moderate       []  High 25 7752-9890 1   []  Modalities      [x]  Ther Exercise 15 0865-3902 1   []  Manual Therapy      []  Ther Activities      []  Aquatics      []  Vasocompression      []  Other        TOTAL TREATMENT TIME: 40      Time in:1700    Time out:1740    Electronically signed by: Cory Kirby PT        Physician Signature:________________________________Date:__________________  By signing above or cosigning this note, I have reviewed this plan of care and certify a need for medically necessary rehabilitation services.      *PLEASE SIGN ABOVE AND FAX BACK ALL PAGES*

## 2022-04-29 ENCOUNTER — HOSPITAL ENCOUNTER (OUTPATIENT)
Dept: PHYSICAL THERAPY | Age: 42
Setting detail: THERAPIES SERIES
Discharge: HOME | End: 2022-04-29
Payer: COMMERCIAL

## 2022-04-29 NOTE — FLOWSHEET NOTE
[x] [facility] ([facility]) - [facility] &  Therapy  [address]    [phone]  F: [phone]   [] [address]  [facility]   [address]  P: [phone]  F: [phone]  [] [address] &  Therapy  [address]  P: [phone]  F: [phone] [] [address]  P: [phone]  F: [phone]  [] [address] [facility]   [address]   [place]: [phone]  F: [phone]   [] [id number] [facility] [address]  [place]: [phone]   F: [phone]     Physical Therapy Cancel/No Show note    Date: 2022  Patient: Barb Pierce  : 1980  MRN: [id number]    Cancels/No Shows to date:     For today's appointment patient:    [x]  Cancelled    [] Rescheduled appointment    [] No-show     Reason given by patient:    []  Patient ill    [x]  Conflicting appointment    [] No transportation      [] Conflict with work    [] No reason given    [] Weather related    [] [id number]    [] Other:      Comments:        [x] Next appointment was confirmed    Electronically signed by: Fede Callahan PTA

## 2022-05-02 ENCOUNTER — HOSPITAL ENCOUNTER (OUTPATIENT)
Dept: PHYSICAL THERAPY | Age: 42
Setting detail: THERAPIES SERIES
Discharge: HOME | End: 2022-05-02
Payer: COMMERCIAL

## 2022-05-02 PROCEDURE — 97110 THERAPEUTIC EXERCISES: CPT

## 2022-05-02 NOTE — FLOWSHEET NOTE
[x] [facility]) [facility] C[facility] &  Therapy  [address]  [phone]  F: [phone] [] [address]  [place] [address]   [address]  P: [phone]  F: [phone] [] [address]  [address]  P: [phone]  F: [phone] [] [address]  P: [phone]  F: [phone] [] [address]  [facility]   [address]   [place]: [phone]  F: [phone]      Physical Therapy Daily Treatment Note    Date:  2022  Patient Name:  Dakotah Joaquin    :  1980  MRN: [id number]  Physician: Nasra Jaeger MD     Insurance: [facility] 9 visits 22-22  Medical Diagnosis: Lumbar strain S39.012A                         Rehab Codes: M54.59  Onset Date: 22                              Next 's appt:   Visit# / total visits: 2     Cancels/No Shows: 0/0    Subjective:    Pain:  [x] Yes  [] No Location: Low back pain  Pain Rating: (0-10 scale) 1/10  Pain altered Tx:  [x] No  [] Yes  Action:  Comments: Patient arrives reporting his back is feeling better. Soreness was worse over the weekend from doing more around the house. Is compliant with HEP.       Objective:  Modalities:   Precautions:  Exercises:  Exercise Reps/ Time Weight/ Level Comments   SciFit 5' Level 2          Standing      Incline stretch 3x20\"           Prone      Hip ext 10x     HS curls 10x 2 lbs    Quad stretch 3x20\"           Supine      LTR 10x10\"     SKTC 3x20\"     SLR 10x ea     Bridge 10x     PPT 10x     PPT w/ marches 10x     PPT w/ OPP arms/legs 10x     PPT w/ Same side 10x     Hamstring stretch 3x20\"     Other:      Treatment Charges: Mins Time in/out  Units   []  Modalities      [x]  Ther Exercise 46 7926-6885 3   []  Manual Therapy      []  Ther Activities      []  Aquatics      []  Vasocompression      []  Other      Total Treatment time 46  3       Assessment: [x] Progressing toward goals. Started with SciFit for warm up. Good progression with mat exercises with stretches of quads and hamstrings. Added supine DLS exercises to increase core strength with good carry over. Demonstrated good understanding of core strengthening and stretches, plan to advance to higher functioning activities next session. [] No change. [] Other:  [x] Patient would continue to benefit from skilled physical therapy services in order to: improve pain free lumbar ROM. Core endurance, and increase work capacity to return to his full duty position    Problems:    [x]? ? Pain: 5/10 low back   [x]? ? ROM: lumbar rotation and side bending   [x]? ? Flexibility: lumbar extensors, hamstrings   [x]? ? Strength: 4/5 B hip flexors, glutes   [x]? ? Function:Oswestry score 36% impaired  []? Other:     STG: (to be met in 9 treatments)  1. ? Pain: 2/10 low back pain with occupational duties. 2. ? ROM: lumbar flexion to reach toes to improve ending and reaching activity. 3. ? Strength: 5/5 B hip flexionand extension to improve trunk stability. 4. Patient is able to squat and lift 40 lbs without back pain. 5. ? Function:Oswestry score to 16% impaired or better to return to work. 6. Independent with Home Exercise Programs     Patient goals: Reduce back pain and improve function.      Rehab Potential:  [x]? Good  []? Fair  []? Poor              Suggested Professional Referral:  [x]? No  []? Yes:  Barriers to Goal Achievement[de-identified]  [x]? No  []? Yes:  Domestic Concerns:  [x]? No  []? Yes:    Pt. Education:  [x] Yes  [] No  [x] Reviewed Prior HEP/Ed  Method of Education: [x] Verbal  [x] Demo  [x] Written 5.2.22 Liztic  Comprehension of Education:  [x] Verbalizes understanding. [x] Demonstrates understanding. [] Needs review.   [x] Demonstrates/verbalizes HEP/Ed previously given.    Access Code: [email or web address]: 05/02/2022Prepared by: Albert Mcgarry   PPT - 1 x daily - 7 x weekly - 3 sets - 10 reps   PPT with marches - 1 x daily - 7 x weekly - 3 sets - 10 reps   PPT w/ OPP arm/leg lifts - 1 x daily - 7 x weekly - 3 sets - 10 reps   SKTC - 1 x daily - 7 x weekly - 3 sets - 10 reps   Supine Hamstring Stretch with Strap - 1 x daily - 7 x weekly - 3 sets - 10 reps   SLR - 1 x daily - 7 x weekly - 3 sets - 10 reps   Prone HS curls - 1 x daily - 7 x weekly - 3 sets - 10 reps       Access Code: [email or web address]  URL: [email or web address]  Date: 04/28/2022  Prepared by: Adama Jimenes     Exercises  Prone Hip Extension - 1 x daily - 4 x weekly - 3 sets - 10 reps  Supine Bridge - 1 x daily - 4 x weekly - 3 sets - 10 reps  Supine Lower Trunk Rotation - 1 x daily - 4 x weekly - 3 sets - 10 reps  Bilateral Bent Leg Lift - 1 x daily - 4 x weekly - 3 sets - 10 reps  Standing Lumbar Extension - 1 x daily - 4 x weekly - 3 sets - 10 reps    Plan: [x] Continue current frequency toward long and short term goals.     [x] Specific Instructions for subsequent treatments: Lumbar ROM, strengthening, work simulaed activities to build back tolerance       Time In: 0900            Time Out: 5862    Electronically signed by:  Michael Boggs PTA

## 2022-05-04 ENCOUNTER — HOSPITAL ENCOUNTER (OUTPATIENT)
Dept: PHYSICAL THERAPY | Age: 42
Setting detail: THERAPIES SERIES
Discharge: HOME | End: 2022-05-04
Payer: COMMERCIAL

## 2022-05-04 PROCEDURE — 97110 THERAPEUTIC EXERCISES: CPT

## 2022-05-04 NOTE — FLOWSHEET NOTE
[x] [facility]) [facility] CENTER &  Therapy  [address]  [phone]  F: [phone] [] [address]  [place] [address]   [address]  P: [phone]  F: [phone] [] [address]  [address]  P: [phone]  F: [phone] [] [address]  P: [phone]  F: [phone] [] [address]  [facility]   [address]   [place]: [phone]  F: [phone]      Physical Therapy Daily Treatment Note    Date:  2022  Patient Name:  Jean Marie Garcia    :  1980  MRN: [id number]  Physician: Ever Ledesma MD     Insurance: [facility] 3-[facility] 9 visits 22-22  Medical Diagnosis: Lumbar strain S39.012A                         Rehab Codes: M54.59  Onset Date: 22                              Next 's appt:   Visit# / total visits: 3     Cancels/No Shows: 0/0    Subjective:    Pain:  [x] Yes  [] No Location: Low back pain  Pain Rating: (0-10 scale) 1/10  Pain altered Tx:  [x] No  [] Yes  Action:  Comments: Patient is doing well no increased pain this morning.       Objective:  Modalities:   Precautions:  Exercises:  Exercise Reps/ Time Weight/ Level Comments   SciFit 5' Level 2          Standing      Incline stretch 3x20\"     Band Rows  20x plum    Band Lat ext  20x plum    3 way hip  20x lime    Squat and box lift  20x 20 lb           Prone      Hip ext 156x     HS curls x 2 lbs    Quad stretch 3x20\"           Supine      LTR 10x10\"     SKTC 3x20\"     SLR 20x ea     Bridge 20x     Hamstring stretch 3x20\"     Other:      Treatment Charges: Mins Time in/out  Units   []  Modalities      [x]  Ther Exercise 55 2876-8951 4   []  Manual Therapy      []  Ther Activities      []  Aquatics      []  Vasocompression      []  Other      Total Treatment time 55 5794-7168 4       Assessment: [x] Progressing toward goals. Patient demonstrates improved lifting tolerance today, He was castro to squat and lift 22 lbs without back pain or functional deficit. [] No change. [] Other:  [x] Patient would continue to benefit from skilled physical therapy services in order to: improve pain free lumbar ROM. Core endurance, and increase work capacity to return to his full duty position    Problems:    [x]? ? Pain: 5/10 low back   [x]? ? ROM: lumbar rotation and side bending   [x]? ? Flexibility: lumbar extensors, hamstrings   [x]? ? Strength: 4/5 B hip flexors, glutes   [x]? ? Function:Oswestry score 36% impaired  []? Other:     STG: (to be met in 9 treatments)  1. ? Pain: 2/10 low back pain with occupational duties. 2. ? ROM: lumbar flexion to reach toes to improve ending and reaching activity. 3. ? Strength: 5/5 B hip flexionand extension to improve trunk stability. 4. Patient is able to squat and lift 40 lbs without back pain. 5. ? Function:Oswestry score to 16% impaired or better to return to work. 6. Independent with Home Exercise Programs     Patient goals: Reduce back pain and improve function.      Rehab Potential:  [x]? Good  []? Fair  []? Poor              Suggested Professional Referral:  [x]? No  []? Yes:  Barriers to Goal Achievement[de-identified]  [x]? No  []? Yes:  Domestic Concerns:  [x]? No  []? Yes:    Pt. Education:  [x] Yes  [] No  [x] Reviewed Prior HEP/Ed  Method of Education: [x] Verbal  [x] Demo  [x] Written 5.2.22 Collusion  Comprehension of Education:  [x] Verbalizes understanding. [x] Demonstrates understanding. [] Needs review. [x] Demonstrates/verbalizes HEP/Ed previously given. Access Code: [email or web address]: "[employer]. com/Date: 05/02/2022Prepared by: Ruth Dutton   PPT - 1 x daily - 7 x weekly - 3 sets - 10 reps   PPT with marches - 1 x daily - 7 x weekly - 3 sets - 10 reps   PPT w/ OPP arm/leg lifts - 1 x daily - 7 x weekly - 3 sets - 10 reps   SKTC - 1 x daily - 7 x weekly - 3 sets - 10 reps   Supine Hamstring Stretch with Strap - 1 x daily - 7 x weekly - 3 sets - 10 reps   SLR - 1 x daily - 7 x weekly - 3 sets - 10 reps   Prone HS curls - 1 x daily - 7 x weekly - 3 sets - 10 reps       Access Code: [email or web address]  URL: [email or web address]/  Date: 04/28/2022  Prepared by: Taylor Push     Exercises  Prone Hip Extension - 1 x daily - 4 x weekly - 3 sets - 10 reps  Supine Bridge - 1 x daily - 4 x weekly - 3 sets - 10 reps  Supine Lower Trunk Rotation - 1 x daily - 4 x weekly - 3 sets - 10 reps  Bilateral Bent Leg Lift - 1 x daily - 4 x weekly - 3 sets - 10 reps  Standing Lumbar Extension - 1 x daily - 4 x weekly - 3 sets - 10 reps    Plan: [x] Continue current frequency toward long and short term goals.     [x] Specific Instructions for subsequent treatments: Lumbar ROM, strengthening, work simulaed activities to build back tolerance       Time In: 0730            Time Out: 0825    Electronically signed by:  Tawnya Sumner PT

## 2022-05-06 ENCOUNTER — HOSPITAL ENCOUNTER (OUTPATIENT)
Dept: PHYSICAL THERAPY | Age: 42
Setting detail: THERAPIES SERIES
Discharge: HOME | End: 2022-05-06
Payer: COMMERCIAL

## 2022-05-06 PROCEDURE — 97110 THERAPEUTIC EXERCISES: CPT

## 2022-05-06 NOTE — FLOWSHEET NOTE
[x] [facility]) [facility] CENTER &  Therapy  [address]  [phone]  F: [phone] [] [address]  [place] [address]   [address]  P: [phone]  F: [phone] [] [address]  [address]  P: [phone]  F: [phone] [] [address]  P: [phone]  F: [phone] [] [address]  [facility]   [address]   [place]: [phone]  F: [phone]      Physical Therapy Daily Treatment Note    Date:  2022  Patient Name:  Igor Hollis    :  1980  MRN: [id number]  Physician: Bee Parham MD     Insurance: [facility] 3-[facility] 9 visits 22-22  Medical Diagnosis: Lumbar strain S39.012A                         Rehab Codes: M54.59  Onset Date: 22                              Next 's appt:   Visit# / total visits:      Cancels/No Shows: 0/0    Subjective:    Pain:  [x] Yes  [] No Location: Low back pain  Pain Rating: (0-10 scale) 1/10  Pain altered Tx:  [x] No  [] Yes  Action:  Comments: Patient states back is feeling \"better for sure\". Reports just minimal soreness in the back at arrival and continued adherence to HEP, especially stretches.       Objective:  Modalities:    Precautions:  Exercises:  Exercise Reps/ Time Weight/ Level Comments   SciFit 5' Level 2          Standing      Incline stretch 3x20\"     Band Rows  20x plum    Band Lat ext  20x plum    3 way hip  20x lime    Squat and box lift  20x 22 lb  Black plinth to ground and back to plinth   Box carry  3x 22lb Around column, box at waist height   Leg press  10x  10x 40lb  50lb          Prone      Hip ext 15x     HS curls x 2 lbs    Quad stretch 3x20\"           Supine      LTR 10x10\"     SKTC 3x20\"     SLR 20x ea     Bridge 20x     Hamstring stretch 3x20\"     Other:      Treatment Charges: Mins Time in/out  Units   []  Modalities      [x]  Ther Exercise 55 6509-257 4   []  Manual Therapy      []  Ther Activities      []  Aquatics      []  Vasocompression      []  Other      Total Treatment time 55 8255-786 4       Assessment: [x] Progressing toward goals. Good tolerance to progression to leg press this date with no pain or symptoms noted. Fair recall for previously introduced exercises, verbal cues for upright posture and appropriate muscle activation with good carryover. Educated for core activation throughout exercises as well as during box lifts and carries with patient verbalizing activation throughout. [] No change. [] Other:  [x] Patient would continue to benefit from skilled physical therapy services in order to: improve pain free lumbar ROM. Core endurance, and increase work capacity to return to his full duty position    Problems:    [x]? ? Pain: 5/10 low back   [x]? ? ROM: lumbar rotation and side bending   [x]? ? Flexibility: lumbar extensors, hamstrings   [x]? ? Strength: 4/5 B hip flexors, glutes   [x]? ? Function:Oswestry score 36% impaired  []? Other:     STG: (to be met in 9 treatments)  1. ? Pain: 2/10 low back pain with occupational duties. 2. ? ROM: lumbar flexion to reach toes to improve ending and reaching activity. 3. ? Strength: 5/5 B hip flexionand extension to improve trunk stability. 4. Patient is able to squat and lift 40 lbs without back pain. 5. ? Function:Oswestry score to 16% impaired or better to return to work. 6. Independent with Home Exercise Programs     Patient goals: Reduce back pain and improve function.      Rehab Potential:  [x]? Good  []? Fair  []? Poor              Suggested Professional Referral:  [x]? No  []? Yes:  Barriers to Goal Achievement[de-identified]  [x]? No  []? Yes:  Domestic Concerns:  [x]? No  []?  Yes:    Pt. Education:  [x] Yes  [] No  [] Reviewed Prior HEP/Ed  Method of Education: [x] Verbal [x] Demo  [] Written   Comprehension of Education:  [x] Verbalizes understanding. [x] Demonstrates understanding. [] Needs review. [x] Demonstrates/verbalizes HEP/Ed previously given. Access Code: [email or web address]: [email or web address]. com/Date: 05/02/2022Prepared by: Francisco J Martin   PPT - 1 x daily - 7 x weekly - 3 sets - 10 reps   PPT with marches - 1 x daily - 7 x weekly - 3 sets - 10 reps   PPT w/ OPP arm/leg lifts - 1 x daily - 7 x weekly - 3 sets - 10 reps   SKTC - 1 x daily - 7 x weekly - 3 sets - 10 reps   Supine Hamstring Stretch with Strap - 1 x daily - 7 x weekly - 3 sets - 10 reps   SLR - 1 x daily - 7 x weekly - 3 sets - 10 reps   Prone HS curls - 1 x daily - 7 x weekly - 3 sets - 10 reps       Access Code: [email or web address]  URL: [email or web address]. com/  Date: 04/28/2022  Prepared by: Malia Escobar     Exercises  Prone Hip Extension - 1 x daily - 4 x weekly - 3 sets - 10 reps  Supine Bridge - 1 x daily - 4 x weekly - 3 sets - 10 reps  Supine Lower Trunk Rotation - 1 x daily - 4 x weekly - 3 sets - 10 reps  Bilateral Bent Leg Lift - 1 x daily - 4 x weekly - 3 sets - 10 reps  Standing Lumbar Extension - 1 x daily - 4 x weekly - 3 sets - 10 reps    Plan: [x] Continue current frequency toward long and short term goals.     [x] Specific Instructions for subsequent treatments: Lumbar ROM, strengthening, work simulaed activities to build back tolerance       Time In: 0700            Time Out: 5180     Electronically signed by:  Uriel Rich PTA

## 2022-05-09 ENCOUNTER — HOSPITAL ENCOUNTER (OUTPATIENT)
Dept: PHYSICAL THERAPY | Age: 42
Setting detail: THERAPIES SERIES
Discharge: HOME | End: 2022-05-09
Payer: COMMERCIAL

## 2022-05-09 PROCEDURE — 97110 THERAPEUTIC EXERCISES: CPT

## 2022-05-09 NOTE — FLOWSHEET NOTE
[x] [facility]) - [facility] CENTER &  Therapy  [address]  [phone]  F: [phone] [] [address]  [address] [facility]   [address]  P: [phone]  F: [phone] [] [address]  [address]  P: [phone]  F: [phone] [] [address]  P: [phone]  F: [phone] [] [address]  [facility]   [address]   [place]: [phone]  F: [phone]      Physical Therapy Daily Treatment Note    Date:  2022  Patient Name:  Igor Hollis    :  1980  MRN: [id number]  Physician: Bee Parham MD     Insurance: [facility] 3-[facility] 9 visits 22-22  Medical Diagnosis: Lumbar strain S39.012A                         Rehab Codes: M54.59  Onset Date: 22                              Next 's appt:   Visit# / total visits:      Cancels/No Shows: 0/0    Subjective:    Pain:  [x] Yes  [] No Location: Low back pain  Pain Rating: (0-10 scale) 1/10  Pain altered Tx:  [x] No  [] Yes  Action:  Comments: Patient has returned to full duty work as restrictions were lifted last week.      Objective:  Modalities:    Precautions:  Exercises:  Exercise Reps/ Time Weight/ Level Comments   SciFit 5' Level 2          Standing      Incline stretch 3x20\"     Band Rows  20x plum    Band Lat ext  20x plum    3 way hip  20x blue    Squat and box lift  20x 22 lb  Black plinth to ground and back to plinth   Box carry  3x 22lb Around column, box at waist height   Leg press  3x10x    50lb            Prone      Hip ext 15x2\"  Stress 2 second hold and glute contraction    HS curls x 2 lbs    Quad stretch 3x20\"           Supine      LTR 5x10\"     Bridge 20x           Seated      Hamstring stretch 3x20\"     Other:      Treatment Charges: Mins Time in/out  Units   []  Modalities      [x]  Ther Exercise 45 0530-2173 3   []  Manual Therapy      []  Ther Activities      []  Aquatics      []  Vasocompression      []  Other      Total Treatment time 45 2187-8547 3       Assessment: [x] Progressing toward goals. Patient continues to tolerate increased lumbar work load with good tolerance and carry over to job skills. [] No change. [] Other:  [x] Patient would continue to benefit from skilled physical therapy services in order to: improve pain free lumbar ROM. Core endurance, and increase work capacity to return to his full duty position    Problems:    [x]? ? Pain: 5/10 low back   [x]? ? ROM: lumbar rotation and side bending   [x]? ? Flexibility: lumbar extensors, hamstrings   [x]? ? Strength: 4/5 B hip flexors, glutes   [x]? ? Function:Oswestry score 36% impaired  []? Other:     STG: (to be met in 9 treatments)  1. ? Pain: 2/10 low back pain with occupational duties. 2. ? ROM: lumbar flexion to reach toes to improve ending and reaching activity. 3. ? Strength: 5/5 B hip flexionand extension to improve trunk stability. 4. Patient is able to squat and lift 40 lbs without back pain. 5. ? Function:Oswestry score to 16% impaired or better to return to work. 6. Independent with Home Exercise Programs     Patient goals: Reduce back pain and improve function.      Rehab Potential:  [x]? Good  []? Fair  []? Poor              Suggested Professional Referral:  [x]? No  []? Yes:  Barriers to Goal Achievement[de-identified]  [x]? No  []? Yes:  Domestic Concerns:  [x]? No  []? Yes:    Pt. Education:  [x] Yes  [] No  [] Reviewed Prior HEP/Ed  Method of Education: [x] Verbal  [x] Demo  [] Written   Comprehension of Education:  [x] Verbalizes understanding. [x] Demonstrates understanding. [] Needs review. [x] Demonstrates/verbalizes HEP/Ed previously given. Access Code: [email or web address]: [email or web address] com/Date: 05/02/2022Prepared by: Nicole Jeffrey PPT - 1 x daily - 7 x weekly - 3 sets - 10 reps   PPT with marches - 1 x daily - 7 x weekly - 3 sets - 10 reps   PPT w/ OPP arm/leg lifts - 1 x daily - 7 x weekly - 3 sets - 10 reps   SKTC - 1 x daily - 7 x weekly - 3 sets - 10 reps   Supine Hamstring Stretch with Strap - 1 x daily - 7 x weekly - 3 sets - 10 reps   SLR - 1 x daily - 7 x weekly - 3 sets - 10 reps   Prone HS curls - 1 x daily - 7 x weekly - 3 sets - 10 reps       Access Code: [email or web address]  URL: [email or web address]/  Date: 04/28/2022  Prepared by: Farheen Ohara     Exercises  Prone Hip Extension - 1 x daily - 4 x weekly - 3 sets - 10 reps  Supine Bridge - 1 x daily - 4 x weekly - 3 sets - 10 reps  Supine Lower Trunk Rotation - 1 x daily - 4 x weekly - 3 sets - 10 reps  Bilateral Bent Leg Lift - 1 x daily - 4 x weekly - 3 sets - 10 reps  Standing Lumbar Extension - 1 x daily - 4 x weekly - 3 sets - 10 reps    Plan: [x] Continue current frequency toward long and short term goals.     [x] Specific Instructions for subsequent treatments: Lumbar ROM, strengthening, work simulaed activities to build back tolerance       Time In: 0800            Time Out: 0845    Electronically signed by:  Shawn Griffith PT

## 2022-05-11 ENCOUNTER — HOSPITAL ENCOUNTER (OUTPATIENT)
Dept: PHYSICAL THERAPY | Age: 42
Setting detail: THERAPIES SERIES
Discharge: HOME | End: 2022-05-11
Payer: COMMERCIAL

## 2022-05-11 PROCEDURE — 97110 THERAPEUTIC EXERCISES: CPT

## 2022-05-11 NOTE — FLOWSHEET NOTE
[x] [facility]) [facility] CENTER &  Therapy  [address]  [phone]  F: [phone] [] [address]  [place] [address]   [address]  P: [phone]  F: [phone] [] [address]  [address]  P: [phone]  F: [phone] [] [address]  P: [phone]  F: [phone] [] [address]  [facility]   [address]   [place]: [phone]  F: [phone]      Physical Therapy Daily Treatment Note    Date:  2022  Patient Name:  Miguel Roman    :  1980  MRN: [id number]  Physician: Mili Miller MD     Insurance: [facility] 3-[facility] 9 visits 22-22  Medical Diagnosis: Lumbar strain S39.012A                         Rehab Codes: M54.59  Onset Date: 22                              Next 's appt:   Visit# / total visits:      Cancels/No Shows: 0/0    Subjective:    Pain:  [x] Yes  [] No Location: Low back pain  Pain Rating: (0-10 scale) 1/10  Pain altered Tx:  [x] No  [] Yes  Action:  Comments: Patient has returned to full duty work as restrictions were lifted last week.      Objective:  Modalities:    Precautions:  Exercises: pl= weight plates  Exercise Reps/ Time Weight/ Level Comments   SciFit 5' Level 2          Standing      Incline stretch 3x20\"     Cybex Cables        Rows  20x 6 pl     Lat ext  20x 3 pl    Seated lat pull down  20x 5 pl           Banded 3 way hip  20x blue    Squat and box lift  20x 2 lb  Floor to window sill   Box carry  3x 27lb Around column, box at waist height   Leg press  3x10x    50lb            Prone      Hip ext 15x2\"  Stress 2 second hold and glute contraction    HS curls x 2 lbs    Quad stretch 3x20\"  Held good ROM noted          Supine      LTR 5x10\"     Bridge 20x           Seated Hamstring stretch 3x20\"     Other:      Treatment Charges: Mins Time in/out  Units   []  Modalities      [x]  Ther Exercise 45 1373-3539 3   []  Manual Therapy      []  Ther Activities      []  Aquatics      []  Vasocompression      []  Other      Total Treatment time 45 6687-8235 3       Assessment: [x] Progressing toward goals. Patient demonstrates improved core strength by tolerating progressions to cybex cables. Lifting and carrying technique is safe and effective. [] No change. [] Other:  [x] Patient would continue to benefit from skilled physical therapy services in order to: improve pain free lumbar ROM. Core endurance, and increase work capacity to return to his full duty position    Problems:    [x]? ? Pain: 5/10 low back   [x]? ? ROM: lumbar rotation and side bending   [x]? ? Flexibility: lumbar extensors, hamstrings   [x]? ? Strength: 4/5 B hip flexors, glutes   [x]? ? Function:Oswestry score 36% impaired  []? Other:     STG: (to be met in 9 treatments)  1. ? Pain: 2/10 low back pain with occupational duties. 2. ? ROM: lumbar flexion to reach toes to improve ending and reaching activity. 3. ? Strength: 5/5 B hip flexionand extension to improve trunk stability. 4. Patient is able to squat and lift 40 lbs without back pain. 5. ? Function:Oswestry score to 16% impaired or better to return to work. 6. Independent with Home Exercise Programs     Patient goals: Reduce back pain and improve function.      Rehab Potential:  [x]? Good  []? Fair  []? Poor              Suggested Professional Referral:  [x]? No  []? Yes:  Barriers to Goal Achievement[de-identified]  [x]? No  []? Yes:  Domestic Concerns:  [x]? No  []? Yes:    Pt. Education:  [x] Yes  [] No  [] Reviewed Prior HEP/Ed  Method of Education: [x] Verbal  [x] Demo  [] Written   Comprehension of Education:  [x] Verbalizes understanding. [x] Demonstrates understanding. [] Needs review. [x] Demonstrates/verbalizes HEP/Ed previously given.     Access Code: [email or web address]: [email or web address]. com/Date: 05/02/2022Prepared by: Albert Mcgarry   PPT - 1 x daily - 7 x weekly - 3 sets - 10 reps   PPT with marches - 1 x daily - 7 x weekly - 3 sets - 10 reps   PPT w/ OPP arm/leg lifts - 1 x daily - 7 x weekly - 3 sets - 10 reps   SKTC - 1 x daily - 7 x weekly - 3 sets - 10 reps   Supine Hamstring Stretch with Strap - 1 x daily - 7 x weekly - 3 sets - 10 reps   SLR - 1 x daily - 7 x weekly - 3 sets - 10 reps   Prone HS curls - 1 x daily - 7 x weekly - 3 sets - 10 reps       Access Code: [email or web address]  URL: [email or web address]  Date: 04/28/2022  Prepared by: Adama Jimenes     Exercises  Prone Hip Extension - 1 x daily - 4 x weekly - 3 sets - 10 reps  Supine Bridge - 1 x daily - 4 x weekly - 3 sets - 10 reps  Supine Lower Trunk Rotation - 1 x daily - 4 x weekly - 3 sets - 10 reps  Bilateral Bent Leg Lift - 1 x daily - 4 x weekly - 3 sets - 10 reps  Standing Lumbar Extension - 1 x daily - 4 x weekly - 3 sets - 10 reps    Plan: [x] Continue current frequency toward long and short term goals.     [x] Specific Instructions for subsequent treatments: Lumbar ROM, strengthening, work simulaed activities to build back tolerance       Time In: 5755            Time Out: 8440    Electronically signed by:  Liliana Thibodeaux PT

## 2022-05-13 ENCOUNTER — HOSPITAL ENCOUNTER (OUTPATIENT)
Dept: PHYSICAL THERAPY | Age: 42
Setting detail: THERAPIES SERIES
Discharge: HOME | End: 2022-05-13
Payer: COMMERCIAL

## 2022-05-13 PROCEDURE — 97110 THERAPEUTIC EXERCISES: CPT

## 2022-05-13 NOTE — FLOWSHEET NOTE
[x] [facility]) [facility] &  Therapy  [address]  [phone]  F: [phone] [] [address]  [address]   [address]  P: [phone]  F: [phone] [] [address]  [address]  P: [phone]  F: [phone] [] [address]  P: [phone]  F: [phone] [] [address]  [facility]   [address]   [place]: [phone]  F: [phone]      Physical Therapy Daily Treatment Note    Date:  2022  Patient Name:  Maximino Sparrow    :  1980  MRN: [id number]  Physician: Aneta Honeycutt MD     Insurance: [facility] 3-[facility] 9 visits 22-22  Medical Diagnosis: Lumbar strain S39.012A                         Rehab Codes: M54.59  Onset Date: 22                              Next 's appt:   Visit# / total visits: 7     Cancels/No Shows: 0/0    Subjective:    Pain:  [x] Yes  [] No Location: Low back pain  Pain Rating: (0-10 scale) 0/10  Pain altered Tx:  [x] No  [] Yes  Action:  Comments: Patient notes work is going well full duty with no restrictions.  Has some fatigue by the end of the day, but denies pain at arrival.    Objective:  Modalities:    Precautions:  Exercises: pl= weight plates  Exercise Reps/ Time Weight/ Level Comments   Treadmill walking 6' 1.5-2.0mph    SciFit 5' Level 2          Standing      Incline stretch 3x20\"     Cybex Cables        Rows  20x 6 pl     Lat ext  20x 3 pl    Seated lat pull down  20x 5 pl           Banded 3 way hip  20x blue    Squat and box lift  20x 27 lb  Floor to window sill   Box carry  3x 27lb Around clinic, box at waist height   Leg press  3x10x    50lb            Prone      Hip ext 15x2\"  Stress 2 second hold and glute contraction    HS curls x 2 lbs    Quad stretch 3x20\"  Held good ROM noted          Supine      LTR 5x10\"     Bridge 20x           Seated      Hamstring stretch 3x20\"     Other:      Treatment Charges: Mins Time in/out  Units   []  Modalities      [x]  Ther Exercise 55 0705-800 4   []  Manual Therapy      []  Ther Activities      []  Aquatics      []  Vasocompression      []  Other      Total Treatment time 55 7409-6899 4       Assessment: [x] Progressing toward goals. Patient continues to demonstrate improved functional mobility and core activation throughout treatment. Fatigue in low back during 3 way hip this date, educated for upright posture and core activation with improved tolerance. [] No change. [] Other:  [x] Patient would continue to benefit from skilled physical therapy services in order to: improve pain free lumbar ROM. Core endurance, and increase work capacity to return to his full duty position    Problems:    [x]? ? Pain: 5/10 low back   [x]? ? ROM: lumbar rotation and side bending   [x]? ? Flexibility: lumbar extensors, hamstrings   [x]? ? Strength: 4/5 B hip flexors, glutes   [x]? ? Function:Oswestry score 36% impaired  []? Other:     STG: (to be met in 9 treatments)  1. ? Pain: 2/10 low back pain with occupational duties. 2. ? ROM: lumbar flexion to reach toes to improve ending and reaching activity. 3. ? Strength: 5/5 B hip flexionand extension to improve trunk stability. 4. Patient is able to squat and lift 40 lbs without back pain. 5. ? Function:Oswestry score to 16% impaired or better to return to work. 6. Independent with Home Exercise Programs     Patient goals: Reduce back pain and improve function.      Rehab Potential:  [x]? Good  []? Fair  []? Poor              Suggested Professional Referral:  [x]? No  []? Yes:  Barriers to Goal Achievement[de-identified]  [x]? No  []? Yes:  Domestic Concerns:  [x]? No  []?  Yes:    Pt. Education:  [x] Yes  [] No  [] Reviewed Prior HEP/Ed  Method of Education: [x] Verbal  [x] Demo  [] Written Comprehension of Education:  [x] Verbalizes understanding. [x] Demonstrates understanding. [] Needs review. [x] Demonstrates/verbalizes HEP/Ed previously given. Access Code: [email or web address]: [email or web address]. com/Date: 05/02/2022Prepared by: Annmarie Aburto   PPT - 1 x daily - 7 x weekly - 3 sets - 10 reps   PPT with marches - 1 x daily - 7 x weekly - 3 sets - 10 reps   PPT w/ OPP arm/leg lifts - 1 x daily - 7 x weekly - 3 sets - 10 reps   SKTC - 1 x daily - 7 x weekly - 3 sets - 10 reps   Supine Hamstring Stretch with Strap - 1 x daily - 7 x weekly - 3 sets - 10 reps   SLR - 1 x daily - 7 x weekly - 3 sets - 10 reps   Prone HS curls - 1 x daily - 7 x weekly - 3 sets - 10 reps       Access Code: [email or web address]  URL: [email or web address]/  Date: 04/28/2022  Prepared by: Zion Almaraz     Exercises  Prone Hip Extension - 1 x daily - 4 x weekly - 3 sets - 10 reps  Supine Bridge - 1 x daily - 4 x weekly - 3 sets - 10 reps  Supine Lower Trunk Rotation - 1 x daily - 4 x weekly - 3 sets - 10 reps  Bilateral Bent Leg Lift - 1 x daily - 4 x weekly - 3 sets - 10 reps  Standing Lumbar Extension - 1 x daily - 4 x weekly - 3 sets - 10 reps    Plan: [x] Continue current frequency toward long and short term goals.     [x] Specific Instructions for subsequent treatments: Lumbar ROM, strengthening, work simulaed activities to build back tolerance, Update HEP prior to discharge       Time In: 0705            Time Out: 0800    Electronically signed by:  Sona Hernandez PTA

## 2022-05-16 ENCOUNTER — HOSPITAL ENCOUNTER (OUTPATIENT)
Dept: PHYSICAL THERAPY | Age: 42
Setting detail: THERAPIES SERIES
Discharge: HOME | End: 2022-05-16
Payer: COMMERCIAL

## 2022-05-16 PROCEDURE — 97110 THERAPEUTIC EXERCISES: CPT

## 2022-05-16 NOTE — FLOWSHEET NOTE
[x] [facility]) - [facility] &  Therapy  [address]  [phone]  F: [phone] [] [address]  [address]   [address]  P: [phone]  F: [phone] [] [address]  [address]  P: [phone]  F: [phone] [] [address]  P: [phone]  F: [phone] [] [address]  [facility]   [address]   [place]: [phone]  F: [phone]      Physical Therapy Daily Treatment Note    Date:  2022  Patient Name:  Igor Hollis    :  1980  MRN: [id number]  Physician: Bee Parham MD     Insurance: [facility] 3-[facility] 9 visits 22-22  Medical Diagnosis: Lumbar strain S39.012A                         Rehab Codes: M54.59  Onset Date: 22                              Next 's appt:   Visit# / total visits: 8/9     Cancels/No Shows: 0/0    Subjective:    Pain:  [x] Yes  [] No Location: Low back pain  Pain Rating: (0-10 scale) 0/10  Pain altered Tx:  [x] No  [] Yes  Action:  Comments: Patient arrives with no complaints.      Objective:  Modalities:    Precautions:  Exercises: pl= weight plates  Exercise Reps/ Time Weight/ Level Comments   Treadmill walking 6' 1.5-2.0mph    SciFit            Standing      Incline stretch 3x20\"     Cybex Cables        Rows  20x 6 pl     Lat ext  20x 3 pl    Seated lat pull down  20x 5 pl           Banded 3 way hip  20x blue    Squat and box lift  20x 27 lb  Floor to window sill   Box carry  3x 27lb Around clinic, box at waist height   Leg press   3x10x    50lb            Prone      Hip ext 15x2\"  Stress 2 second hold and glute contraction    HS curls x 2 lbs    Quad stretch 3x20\"  Held good ROM noted          Supine      LTR 5x10\"     Bridge 20x           Seated      Hamstring stretch 3x20\"     Other:      Treatment Charges: Mins Time in/out  Units   []  Modalities      [x]  Ther Exercise 48 2620-4545    []  Manual Therapy      []  Ther Activities      []  Aquatics      []  Vasocompression      []  Other      Total Treatment time 48 mins  3       Assessment: [x] Progressing toward goals. Continued exercises per log this date with minimal cues for exercise techniques and progressions with good carryover. Good techniques and lifting and carrying noted. [] No change. [] Other:  [x] Patient would continue to benefit from skilled physical therapy services in order to: improve pain free lumbar ROM. Core endurance, and increase work capacity to return to his full duty position    Problems:    [x]? ? Pain: 5/10 low back   [x]? ? ROM: lumbar rotation and side bending   [x]? ? Flexibility: lumbar extensors, hamstrings   [x]? ? Strength: 4/5 B hip flexors, glutes   [x]? ? Function:Oswestry score 36% impaired  []? Other:     STG: (to be met in 9 treatments)  1. ? Pain: 2/10 low back pain with occupational duties. 2. ? ROM: lumbar flexion to reach toes to improve ending and reaching activity. 3. ? Strength: 5/5 B hip flexionand extension to improve trunk stability. 4. Patient is able to squat and lift 40 lbs without back pain. 5. ? Function:Oswestry score to 16% impaired or better to return to work. 6. Independent with Home Exercise Programs     Patient goals: Reduce back pain and improve function.      Rehab Potential:  [x]? Good  []? Fair  []? Poor              Suggested Professional Referral:  [x]? No  []? Yes:  Barriers to Goal Achievement[de-identified]  [x]? No  []? Yes:  Domestic Concerns:  [x]? No  []? Yes:    Pt. Education:  [x] Yes  [] No  [] Reviewed Prior HEP/Ed  Method of Education: [x] Verbal  [x] Demo  [] Written   Comprehension of Education:  [x] Verbalizes understanding. [x] Demonstrates understanding. [] Needs review.   [x] Demonstrates/verbalizes HEP/Ed previously given.    Access Code: [email or web address]: [email or web address]. com/Date: 05/02/2022Prepared by: Kirsty Spine   PPT - 1 x daily - 7 x weekly - 3 sets - 10 reps   PPT with marches - 1 x daily - 7 x weekly - 3 sets - 10 reps   PPT w/ OPP arm/leg lifts - 1 x daily - 7 x weekly - 3 sets - 10 reps   SKTC - 1 x daily - 7 x weekly - 3 sets - 10 reps   Supine Hamstring Stretch with Strap - 1 x daily - 7 x weekly - 3 sets - 10 reps   SLR - 1 x daily - 7 x weekly - 3 sets - 10 reps   Prone HS curls - 1 x daily - 7 x weekly - 3 sets - 10 reps       Access Code: [email or web address]  URL: [email or web address]  Date: 04/28/2022  Prepared by: [place] [employer]     Exercises  Prone Hip Extension - 1 x daily - 4 x weekly - 3 sets - 10 reps  Supine Bridge - 1 x daily - 4 x weekly - 3 sets - 10 reps  Supine Lower Trunk Rotation - 1 x daily - 4 x weekly - 3 sets - 10 reps  Bilateral Bent Leg Lift - 1 x daily - 4 x weekly - 3 sets - 10 reps  Standing Lumbar Extension - 1 x daily - 4 x weekly - 3 sets - 10 reps    Plan: [x] Continue current frequency toward long and short term goals.     [x] Specific Instructions for subsequent treatments: Lumbar ROM, strengthening, work simulaed activities to build back tolerance, Update HEP prior to discharge       Time In: 0735            Time Out: 0829    Electronically signed by:  Alexander Mascorro PTA

## 2022-05-18 ENCOUNTER — HOSPITAL ENCOUNTER (OUTPATIENT)
Dept: PHYSICAL THERAPY | Age: 42
Setting detail: THERAPIES SERIES
Discharge: HOME | End: 2022-05-18
Payer: COMMERCIAL

## 2022-05-18 PROCEDURE — 97110 THERAPEUTIC EXERCISES: CPT

## 2022-05-18 NOTE — FLOWSHEET NOTE
[x] [facility]) [facility] CENTER &  Therapy  [address]  [phone]  F: [phone] [] [address]  [place] [address]   [address]  P: [phone]  F: [phone] [] [address]  [address]  P: [phone]  F: [phone] [] [address]  P: [phone]  F: [phone] [] [address]  [facility]   [address]   [place]: [phone]  F: [phone]      Physical Therapy Daily Treatment Note    Date:  2022  Patient Name:  Miguel Roman    :  1980  MRN: [id number]  Physician: Mili Miller MD     Insurance: [facility] 3-[facility] 9 visits 22-22  Medical Diagnosis: Lumbar strain S39.012A                         Rehab Codes: M54.59  Onset Date: 22                              Next 's appt: PRN   Visit# / total visits:      Cancels/No Shows: 0/0    Subjective:    Pain:  [x] Yes  [] No Location: Low back pain  Pain Rating: (0-10 scale) 0/10  Pain altered Tx:  [x] No  [] Yes  Action:  Comments: Patient arrives with no complaints.      Objective:  Modalities:    Precautions:  Exercises: pl= weight plates  Exercise Reps/ Time Weight/ Level Comments   Treadmill walking 6' 1.5-2.0mph    SciFit            Standing      Incline stretch 3x20\"     Cybex Cables        Rows  20x 6 pl     Lat ext  20x 3.5 pl    Seated lat pull down  20x 5 pl           Banded 3 way hip  20x blue    Squat and box lift  2x10x 40 lb  Floor to window sill   Box carry  3x 27lb Around clinic, box at waist height   Leg press   3x10x    50lb            Prone   Held   Hip ext 15x2\"  Stress 2 second hold and glute contraction    HS curls x 2 lbs    Quad stretch 3x20\"  Held good ROM noted          Supine   Held   LTR 5x10\"     Bridge 20x           Seated Held   Hamstring stretch 3x20\"     Other:      Treatment Charges: Mins Time in/out  Units   []  Modalities      [x]  Ther Exercise 45 1356-160 3   []  Manual Therapy      []  Ther Activities      []  Aquatics      []  Vasocompression      []  Other      Total Treatment time 45 mins  3       Assessment: [x] Progressing toward goals. See same date discharge note. [] No change. [] Other:  [x] Patient would continue to benefit from skilled physical therapy services in order to: improve pain free lumbar ROM. Core endurance, and increase work capacity to return to his full duty position    Problems:    [x]? ? Pain: 5/10 low back   [x]? ? ROM: lumbar rotation and side bending   [x]? ? Flexibility: lumbar extensors, hamstrings   [x]? ? Strength: 4/5 B hip flexors, glutes   [x]? ? Function:Oswestry score 36% impaired  []? Other:     STG: (to be met in 9 treatments)  1. ? Pain: 2/10 low back pain with occupational duties. MET  2. ? ROM: lumbar flexion to reach toes to improve ending and reaching activity. MET   3. ? Strength: 5/5 B hip flexionand extension to improve trunk stability. MET  4. Patient is able to squat and lift 40 lbs without back pain. MET  5. ? Function:Oswestry score to 16% impaired or better to return to work. MET  6. Independent with Home Exercise Programs MET     Patient goals: Reduce back pain and improve function.      Rehab Potential:  [x]? Good  []? Fair  []? Poor              Suggested Professional Referral:  [x]? No  []? Yes:  Barriers to Goal Achievement[de-identified]  [x]? No  []? Yes:  Domestic Concerns:  [x]? No  []? Yes:    Pt. Education:  [x] Yes  [] No  [] Reviewed Prior HEP/Ed  Method of Education: [x] Verbal  [x] Demo  [] Written   Comprehension of Education:  [x] Verbalizes understanding. [x] Demonstrates understanding. [] Needs review. [x] Demonstrates/verbalizes HEP/Ed previously given. Access Code: [email or web address]: [email or web address]. com/Date: 05/02/2022Prepared by: Nancy Purdy AriasExercises   PPT - 1 x daily - 7 x weekly - 3 sets - 10 reps   PPT with marches - 1 x daily - 7 x weekly - 3 sets - 10 reps   PPT w/ OPP arm/leg lifts - 1 x daily - 7 x weekly - 3 sets - 10 reps   SKTC - 1 x daily - 7 x weekly - 3 sets - 10 reps   Supine Hamstring Stretch with Strap - 1 x daily - 7 x weekly - 3 sets - 10 reps   SLR - 1 x daily - 7 x weekly - 3 sets - 10 reps   Prone HS curls - 1 x daily - 7 x weekly - 3 sets - 10 reps       Access Code: [email or web address]  URL: [email or web address]. com/  Date: 04/28/2022  Prepared by: Nereida Garcia     Exercises  Prone Hip Extension - 1 x daily - 4 x weekly - 3 sets - 10 reps  Supine Bridge - 1 x daily - 4 x weekly - 3 sets - 10 reps  Supine Lower Trunk Rotation - 1 x daily - 4 x weekly - 3 sets - 10 reps  Bilateral Bent Leg Lift - 1 x daily - 4 x weekly - 3 sets - 10 reps  Standing Lumbar Extension - 1 x daily - 4 x weekly - 3 sets - 10 reps    Plan: D/C         Time In: 0730           Time Out: 0815    Electronically signed by:  Amador Ham PT

## 2022-05-18 NOTE — DISCHARGE SUMMARY
[x] [phone]  - [facility] TWELVE-STEP [facility] &  Therapy  [address]  [phone]  F: [phone] [] [address]  [employer]   [address]  P: [phone]  F: [phone] [] [address] &  Therapy  [address]  P: [phone]  F: [phone] [] [address]  P: [phone]  F: [phone] [] [address]  [address] [facility]   [address]   [place]: [phone]  F: [phone]      Physical Therapy Discharge Note    Date: 2022      Patient: Danelle Pace  : 1980  MRN: [id number]    Physician: Kike Oconnor MD     Insurance: [facility] 9 visits 22-22  Medical Diagnosis: Lumbar strain S39.012A                         Rehab Codes: M54.59  Onset Date: 22                              Next 's appt: PRN   Visit# / total visits:                                     Cancels/No Shows: 0/0    Subjective:    Pain:  [x]? Yes  []? No   Location: Low back pain         Pain Rating: (0-10 scale) 0/10  Pain altered Tx:  [x]? No  []? Yes  Action:  Comments: Patient arrives with no complaints. Back has been feeling good at work. Only notes occasional lumbar stiffness. Objective:  Test Measurements:  ROM: Full trunk flexion   Strength: 5/5 B hip flexion and extension  Function: Oswestry score 0% impairment     Assessment:  Patient demonstrates improved lumbar ROM, strength and function. Patient is castro to lift and carry 40 lbs without functional deficit. Patient has returned to full duty work. Recommend continued HEP as needed to maintain healthy back. Discharging from formal PT this date. STG: (to be met in 9 treatments)  1. ? Pain: 2/10 low back pain with occupational duties.  MET  2. ? ROM: lumbar flexion to reach toes to improve ending and reaching activity. MET   3. ? Strength: 5/5 B hip flexionand extension to improve trunk stability. MET  4. Patient is able to squat and lift 40 lbs without back pain. MET  5. ? Function:Oswestry score to 16% impaired or better to return to work.  MET  6. Independent with Home Exercise Programs MET       Treatment to Date:  [x] Therapeutic Exercise    [] Modalities:  [] Therapeutic Activity     [] Ultrasound  [] Electrical Stimulation  [] Gait Training     [] Massage       [] Lumbar/Cervical Traction  [] Neuromuscular Re-education [] Cold/hotpack [] Iontophoresis: 4 mg/mL  [x] Instruction in Home Exercise Program                     Dexamethasone Sodium  [] Manual Therapy             Phosphate 40-80 mAmin  [] Aquatic Therapy                   [] Vasocompression/    [] Other:             Game Ready    Discharge Status:     [x] Pt recovered from conditions. Treatment goals were met. [] Pt received maximum benefit. No further therapy indicated at this time. [] Pt to continue exercise/home instructions independently. [] Therapy interrupted due to:    [] Pt has 2 or more no shows/cancels, is discontinued per our policy. [] Pt has completed prescribed number of treatment sessions. [] Other:         Electronically signed by Heron Ochoa PT on 5/18/2022 at 7:47 AM      If you have any questions or concerns, please don't hesitate to call.   Thank you for your referral.